# Patient Record
Sex: MALE | Race: WHITE
[De-identification: names, ages, dates, MRNs, and addresses within clinical notes are randomized per-mention and may not be internally consistent; named-entity substitution may affect disease eponyms.]

---

## 2021-03-26 ENCOUNTER — HOSPITAL ENCOUNTER (EMERGENCY)
Dept: HOSPITAL 94 - ER | Age: 51
Discharge: HOME | End: 2021-03-26
Payer: COMMERCIAL

## 2021-03-26 VITALS — BODY MASS INDEX: 20.04 KG/M2 | HEIGHT: 69 IN | WEIGHT: 135.28 LBS

## 2021-03-26 VITALS — SYSTOLIC BLOOD PRESSURE: 154 MMHG | DIASTOLIC BLOOD PRESSURE: 100 MMHG

## 2021-03-26 DIAGNOSIS — Z72.89: ICD-10-CM

## 2021-03-26 DIAGNOSIS — M79.605: ICD-10-CM

## 2021-03-26 DIAGNOSIS — G89.18: Primary | ICD-10-CM

## 2021-03-26 DIAGNOSIS — Z79.899: ICD-10-CM

## 2021-03-26 DIAGNOSIS — Z98.890: ICD-10-CM

## 2021-03-26 DIAGNOSIS — F17.200: ICD-10-CM

## 2021-03-26 PROCEDURE — 99284 EMERGENCY DEPT VISIT MOD MDM: CPT

## 2021-03-26 PROCEDURE — 64450 NJX AA&/STRD OTHER PN/BRANCH: CPT

## 2021-03-26 PROCEDURE — 64447 NJX AA&/STRD FEMORAL NRV IMG: CPT

## 2021-03-28 ENCOUNTER — HOSPITAL ENCOUNTER (EMERGENCY)
Dept: HOSPITAL 94 - ER | Age: 51
Discharge: LEFT BEFORE DISCHARGE | End: 2021-03-28
Payer: COMMERCIAL

## 2021-03-28 VITALS — SYSTOLIC BLOOD PRESSURE: 120 MMHG | DIASTOLIC BLOOD PRESSURE: 80 MMHG

## 2021-03-28 VITALS — WEIGHT: 154.32 LBS | BODY MASS INDEX: 22.86 KG/M2 | HEIGHT: 69 IN

## 2021-03-28 DIAGNOSIS — Z53.21: ICD-10-CM

## 2021-03-28 DIAGNOSIS — T83.091A: Primary | ICD-10-CM

## 2021-03-29 ENCOUNTER — HOSPITAL ENCOUNTER (EMERGENCY)
Dept: HOSPITAL 94 - ER | Age: 51
Discharge: HOME | End: 2021-03-29
Payer: COMMERCIAL

## 2021-03-29 VITALS — SYSTOLIC BLOOD PRESSURE: 139 MMHG | DIASTOLIC BLOOD PRESSURE: 89 MMHG

## 2021-03-29 VITALS — WEIGHT: 154.32 LBS | HEIGHT: 69 IN | BODY MASS INDEX: 22.86 KG/M2

## 2021-03-29 DIAGNOSIS — Z98.890: ICD-10-CM
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
## 2021-03-29 DIAGNOSIS — Z79.899: ICD-10-CM

## 2021-03-29 DIAGNOSIS — M96.840: Primary | ICD-10-CM

## 2021-03-29 DIAGNOSIS — Z72.89: ICD-10-CM
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
## 2021-03-29 DIAGNOSIS — Z87.81: ICD-10-CM

## 2021-03-29 PROCEDURE — 99283 EMERGENCY DEPT VISIT LOW MDM: CPT

## 2021-05-12 ENCOUNTER — HOSPITAL ENCOUNTER (INPATIENT)
Dept: HOSPITAL 94 - ER | Age: 51
LOS: 7 days | Discharge: HOME | DRG: 908 | End: 2021-05-19
Attending: FAMILY MEDICINE | Admitting: SPECIALIST
Payer: COMMERCIAL

## 2021-05-12 VITALS — HEIGHT: 70 IN | BODY MASS INDEX: 20.09 KG/M2 | WEIGHT: 140.3 LBS

## 2021-05-12 DIAGNOSIS — Z79.899: ICD-10-CM

## 2021-05-12 DIAGNOSIS — Z20.822: ICD-10-CM

## 2021-05-12 DIAGNOSIS — L03.116: ICD-10-CM

## 2021-05-12 DIAGNOSIS — F12.90: ICD-10-CM

## 2021-05-12 DIAGNOSIS — M86.8X7: ICD-10-CM
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
## 2021-05-12 DIAGNOSIS — T85.79XA: Primary | ICD-10-CM

## 2021-05-12 DIAGNOSIS — B95.61: ICD-10-CM

## 2021-05-12 DIAGNOSIS — Z79.82: ICD-10-CM

## 2021-05-12 DIAGNOSIS — F17.210: ICD-10-CM

## 2021-05-12 LAB
ALBUMIN SERPL BCP-MCNC: 3.7 G/DL (ref 3.4–5)
ALBUMIN/GLOB SERPL: 0.9 {RATIO} (ref 1.1–1.5)
ALP SERPL-CCNC: 87 IU/L (ref 46–116)
ALT SERPL W P-5'-P-CCNC: 23 U/L (ref 12–78)
ANION GAP SERPL CALCULATED.3IONS-SCNC: 8 MMOL/L (ref 8–16)
AST SERPL W P-5'-P-CCNC: 21 U/L (ref 10–37)
BASOPHILS # BLD AUTO: 0.1 X10'3 (ref 0–0.2)
BASOPHILS NFR BLD AUTO: 1.1 % (ref 0–1)
BILIRUB SERPL-MCNC: 0.3 MG/DL (ref 0.1–1)
BUN SERPL-MCNC: 12 MG/DL (ref 7–18)
BUN/CREAT SERPL: 12.9 (ref 5.4–32)
CALCIUM SERPL-MCNC: 8.7 MG/DL (ref 8.5–10.1)
CHLORIDE SERPL-SCNC: 100 MMOL/L (ref 99–107)
CLARITY UR: CLEAR
CO2 SERPL-SCNC: 25.1 MMOL/L (ref 24–32)
COLOR UR: (no result)
CREAT SERPL-MCNC: 0.93 MG/DL (ref 0.6–1.1)
EOSINOPHIL # BLD AUTO: 0.2 X10'3 (ref 0–0.9)
EOSINOPHIL NFR BLD AUTO: 2.6 % (ref 0–6)
ERYTHROCYTE [DISTWIDTH] IN BLOOD BY AUTOMATED COUNT: 13.3 % (ref 11.5–14.5)
GFR SERPL CREATININE-BSD FRML MDRD: 86 ML/MIN
GLUCOSE SERPL-MCNC: 115 MG/DL (ref 70–104)
GLUCOSE UR STRIP-MCNC: NEGATIVE MG/DL
HCT VFR BLD AUTO: 37.6 % (ref 42–52)
HGB BLD-MCNC: 13 G/DL (ref 14–17.9)
HGB UR QL STRIP: NEGATIVE
KETONES UR STRIP-MCNC: NEGATIVE MG/DL
LEUKOCYTE ESTERASE UR QL STRIP: NEGATIVE
LYMPHOCYTES # BLD AUTO: 1.7 X10'3 (ref 1.1–4.8)
LYMPHOCYTES NFR BLD AUTO: 17.9 % (ref 21–51)
MCH RBC QN AUTO: 33.5 PG (ref 27–31)
MCHC RBC AUTO-ENTMCNC: 34.7 G/DL (ref 33–36.5)
MCV RBC AUTO: 96.6 FL (ref 78–98)
MONOCYTES # BLD AUTO: 0.9 X10'3 (ref 0–0.9)
MONOCYTES NFR BLD AUTO: 9.6 % (ref 2–12)
NEUTROPHILS # BLD AUTO: 6.4 X10'3 (ref 1.8–7.7)
NEUTROPHILS NFR BLD AUTO: 68.8 % (ref 42–75)
NITRITE UR QL STRIP: NEGATIVE
PH UR STRIP: 6 [PH] (ref 4.8–8)
PLATELET # BLD AUTO: 224 X10'3 (ref 140–440)
PMV BLD AUTO: 7.6 FL (ref 7.4–10.4)
POTASSIUM SERPL-SCNC: 3.9 MMOL/L (ref 3.5–5.1)
PROT SERPL-MCNC: 7.6 G/DL (ref 6.4–8.2)
PROT UR QL STRIP: NEGATIVE MG/DL
RBC # BLD AUTO: 3.89 X10'6 (ref 4.7–6.1)
SODIUM SERPL-SCNC: 133 MMOL/L (ref 135–145)
SP GR UR STRIP: <=1.005 (ref 1–1.03)
URN COLLECT METHOD CLASS: (no result)
UROBILINOGEN UR STRIP-MCNC: 0.2 E.U/DL (ref 0.2–1)
WBC # BLD AUTO: 9.3 X10'3 (ref 4.5–11)

## 2021-05-12 PROCEDURE — 86900 BLOOD TYPING SEROLOGIC ABO: CPT

## 2021-05-12 PROCEDURE — 36415 COLL VENOUS BLD VENIPUNCTURE: CPT

## 2021-05-12 PROCEDURE — 87070 CULTURE OTHR SPECIMN AEROBIC: CPT

## 2021-05-12 PROCEDURE — 84132 ASSAY OF SERUM POTASSIUM: CPT

## 2021-05-12 PROCEDURE — 86901 BLOOD TYPING SEROLOGIC RH(D): CPT

## 2021-05-12 PROCEDURE — 80202 ASSAY OF VANCOMYCIN: CPT

## 2021-05-12 PROCEDURE — 87081 CULTURE SCREEN ONLY: CPT

## 2021-05-12 PROCEDURE — 80053 COMPREHEN METABOLIC PANEL: CPT

## 2021-05-12 PROCEDURE — 83605 ASSAY OF LACTIC ACID: CPT

## 2021-05-12 PROCEDURE — 96365 THER/PROPH/DIAG IV INF INIT: CPT

## 2021-05-12 PROCEDURE — 99285 EMERGENCY DEPT VISIT HI MDM: CPT

## 2021-05-12 PROCEDURE — BQ2S1ZZ COMPUTERIZED TOMOGRAPHY (CT SCAN) OF LEFT LOWER EXTREMITY USING LOW OSMOLAR CONTRAST: ICD-10-PCS

## 2021-05-12 PROCEDURE — 83735 ASSAY OF MAGNESIUM: CPT

## 2021-05-12 PROCEDURE — 85651 RBC SED RATE NONAUTOMATED: CPT

## 2021-05-12 PROCEDURE — 82948 REAGENT STRIP/BLOOD GLUCOSE: CPT

## 2021-05-12 PROCEDURE — 99152 MOD SED SAME PHYS/QHP 5/>YRS: CPT

## 2021-05-12 PROCEDURE — 87040 BLOOD CULTURE FOR BACTERIA: CPT

## 2021-05-12 PROCEDURE — 85025 COMPLETE CBC W/AUTO DIFF WBC: CPT

## 2021-05-12 PROCEDURE — 73701 CT LOWER EXTREMITY W/DYE: CPT

## 2021-05-12 PROCEDURE — 36558 INSERT TUNNELED CV CATH: CPT

## 2021-05-12 PROCEDURE — 99153 MOD SED SAME PHYS/QHP EA: CPT

## 2021-05-12 PROCEDURE — 87186 SC STD MICRODIL/AGAR DIL: CPT

## 2021-05-12 PROCEDURE — 76937 US GUIDE VASCULAR ACCESS: CPT

## 2021-05-12 PROCEDURE — 36573 INSJ PICC RS&I 5 YR+: CPT

## 2021-05-12 PROCEDURE — 87635 SARS-COV-2 COVID-19 AMP PRB: CPT

## 2021-05-12 PROCEDURE — 93005 ELECTROCARDIOGRAM TRACING: CPT

## 2021-05-12 PROCEDURE — 86885 COOMBS TEST INDIRECT QUAL: CPT

## 2021-05-12 PROCEDURE — 81003 URINALYSIS AUTO W/O SCOPE: CPT

## 2021-05-12 PROCEDURE — 84145 PROCALCITONIN (PCT): CPT

## 2021-05-12 PROCEDURE — 87077 CULTURE AEROBIC IDENTIFY: CPT

## 2021-05-13 VITALS — DIASTOLIC BLOOD PRESSURE: 88 MMHG | SYSTOLIC BLOOD PRESSURE: 127 MMHG

## 2021-05-13 VITALS — SYSTOLIC BLOOD PRESSURE: 123 MMHG | DIASTOLIC BLOOD PRESSURE: 80 MMHG

## 2021-05-13 VITALS — DIASTOLIC BLOOD PRESSURE: 74 MMHG | SYSTOLIC BLOOD PRESSURE: 115 MMHG

## 2021-05-13 VITALS — DIASTOLIC BLOOD PRESSURE: 78 MMHG | SYSTOLIC BLOOD PRESSURE: 128 MMHG

## 2021-05-13 RX ADMIN — CEFEPIME HYDROCHLORIDE SCH MLS/HR: 2 INJECTION, SOLUTION INTRAVENOUS at 16:40

## 2021-05-13 RX ADMIN — NICOTINE SCH PATCH: 7 PATCH TRANSDERMAL at 01:58

## 2021-05-13 RX ADMIN — VANCOMYCIN HYDROCHLORIDE SCH MLS/HR: 750 INJECTION, POWDER, LYOPHILIZED, FOR SOLUTION INTRAVENOUS at 12:00

## 2021-05-13 RX ADMIN — Medication SCH MMU: at 20:34

## 2021-05-13 RX ADMIN — VANCOMYCIN HYDROCHLORIDE SCH MLS/HR: 750 INJECTION, POWDER, LYOPHILIZED, FOR SOLUTION INTRAVENOUS at 22:06

## 2021-05-13 NOTE — NUR
Problems reprioritized. Patient report given, questions answered & plan of care reviewed 
with edis RN in surgical .

## 2021-05-13 NOTE — NUR
Problems reprioritized. Patient report given, questions answered & plan of care reviewed 
with Umesh Thomas traveler nurse.

## 2021-05-13 NOTE — NUR
Student documentation:

I have reviewed and agree with all interventions, assessments performed and documented by 
Viktoriya PORTILLO.

## 2021-05-13 NOTE — NUR
Patient in room ED 6. I have received report from Tracy Thomas from ED  and had the opportunity 
to ask questions and assume patient care.

## 2021-05-13 NOTE — NUR
Patient in room ORTHO 4020. I have received report from NORMAN Bourgeois   and had the opportunity 
to ask questions and assume patient care.

## 2021-05-13 NOTE — NUR
PAGER ID:  [phone] 

MESSAGE:  Eddie Chapman#[phone] - Pt is very itchy can he please have some Benadryl. Thank you, 
[phone]

## 2021-05-14 VITALS — DIASTOLIC BLOOD PRESSURE: 75 MMHG | SYSTOLIC BLOOD PRESSURE: 130 MMHG

## 2021-05-14 VITALS — DIASTOLIC BLOOD PRESSURE: 78 MMHG | SYSTOLIC BLOOD PRESSURE: 121 MMHG

## 2021-05-14 VITALS — SYSTOLIC BLOOD PRESSURE: 120 MMHG | DIASTOLIC BLOOD PRESSURE: 73 MMHG

## 2021-05-14 VITALS — DIASTOLIC BLOOD PRESSURE: 79 MMHG | SYSTOLIC BLOOD PRESSURE: 123 MMHG

## 2021-05-14 VITALS — SYSTOLIC BLOOD PRESSURE: 126 MMHG | DIASTOLIC BLOOD PRESSURE: 76 MMHG

## 2021-05-14 VITALS — DIASTOLIC BLOOD PRESSURE: 81 MMHG | SYSTOLIC BLOOD PRESSURE: 124 MMHG

## 2021-05-14 VITALS — DIASTOLIC BLOOD PRESSURE: 82 MMHG | SYSTOLIC BLOOD PRESSURE: 121 MMHG

## 2021-05-14 VITALS — DIASTOLIC BLOOD PRESSURE: 66 MMHG | SYSTOLIC BLOOD PRESSURE: 117 MMHG

## 2021-05-14 VITALS — SYSTOLIC BLOOD PRESSURE: 119 MMHG | DIASTOLIC BLOOD PRESSURE: 81 MMHG

## 2021-05-14 VITALS — DIASTOLIC BLOOD PRESSURE: 93 MMHG | SYSTOLIC BLOOD PRESSURE: 126 MMHG

## 2021-05-14 VITALS — SYSTOLIC BLOOD PRESSURE: 120 MMHG | DIASTOLIC BLOOD PRESSURE: 70 MMHG

## 2021-05-14 VITALS — SYSTOLIC BLOOD PRESSURE: 131 MMHG | DIASTOLIC BLOOD PRESSURE: 84 MMHG

## 2021-05-14 VITALS — DIASTOLIC BLOOD PRESSURE: 76 MMHG | SYSTOLIC BLOOD PRESSURE: 125 MMHG

## 2021-05-14 VITALS — DIASTOLIC BLOOD PRESSURE: 75 MMHG | SYSTOLIC BLOOD PRESSURE: 122 MMHG

## 2021-05-14 VITALS — SYSTOLIC BLOOD PRESSURE: 122 MMHG | DIASTOLIC BLOOD PRESSURE: 65 MMHG

## 2021-05-14 VITALS — DIASTOLIC BLOOD PRESSURE: 68 MMHG | SYSTOLIC BLOOD PRESSURE: 141 MMHG

## 2021-05-14 VITALS — SYSTOLIC BLOOD PRESSURE: 121 MMHG | DIASTOLIC BLOOD PRESSURE: 82 MMHG

## 2021-05-14 VITALS — SYSTOLIC BLOOD PRESSURE: 127 MMHG | DIASTOLIC BLOOD PRESSURE: 74 MMHG

## 2021-05-14 VITALS — DIASTOLIC BLOOD PRESSURE: 68 MMHG | SYSTOLIC BLOOD PRESSURE: 120 MMHG

## 2021-05-14 VITALS — DIASTOLIC BLOOD PRESSURE: 76 MMHG | SYSTOLIC BLOOD PRESSURE: 118 MMHG

## 2021-05-14 VITALS — DIASTOLIC BLOOD PRESSURE: 86 MMHG | SYSTOLIC BLOOD PRESSURE: 132 MMHG

## 2021-05-14 VITALS — SYSTOLIC BLOOD PRESSURE: 116 MMHG | DIASTOLIC BLOOD PRESSURE: 76 MMHG

## 2021-05-14 VITALS — DIASTOLIC BLOOD PRESSURE: 78 MMHG | SYSTOLIC BLOOD PRESSURE: 129 MMHG

## 2021-05-14 LAB
ALBUMIN SERPL BCP-MCNC: 3.1 G/DL (ref 3.4–5)
ALBUMIN/GLOB SERPL: 0.8 {RATIO} (ref 1.1–1.5)
ALP SERPL-CCNC: 75 IU/L (ref 46–116)
ALT SERPL W P-5'-P-CCNC: 23 U/L (ref 12–78)
ANION GAP SERPL CALCULATED.3IONS-SCNC: 7 MMOL/L (ref 8–16)
AST SERPL W P-5'-P-CCNC: 16 U/L (ref 10–37)
BASOPHILS # BLD AUTO: 0 X10'3 (ref 0–0.2)
BASOPHILS NFR BLD AUTO: 0.5 % (ref 0–1)
BILIRUB SERPL-MCNC: 0.6 MG/DL (ref 0.1–1)
BUN SERPL-MCNC: 7 MG/DL (ref 7–18)
BUN/CREAT SERPL: 7.7 (ref 5.4–32)
CALCIUM SERPL-MCNC: 9.2 MG/DL (ref 8.5–10.1)
CHLORIDE SERPL-SCNC: 101 MMOL/L (ref 99–107)
CO2 SERPL-SCNC: 30 MMOL/L (ref 24–32)
CREAT SERPL-MCNC: 0.91 MG/DL (ref 0.6–1.1)
EOSINOPHIL # BLD AUTO: 0.2 X10'3 (ref 0–0.9)
EOSINOPHIL NFR BLD AUTO: 2 % (ref 0–6)
ERYTHROCYTE [DISTWIDTH] IN BLOOD BY AUTOMATED COUNT: 13.1 % (ref 11.5–14.5)
GFR SERPL CREATININE-BSD FRML MDRD: 88 ML/MIN
GLUCOSE SERPL-MCNC: 118 MG/DL (ref 70–104)
HCT VFR BLD AUTO: 37.5 % (ref 42–52)
HGB BLD-MCNC: 13.1 G/DL (ref 14–17.9)
LYMPHOCYTES # BLD AUTO: 1 X10'3 (ref 1.1–4.8)
LYMPHOCYTES NFR BLD AUTO: 10.5 % (ref 21–51)
MCH RBC QN AUTO: 34 PG (ref 27–31)
MCHC RBC AUTO-ENTMCNC: 35 G/DL (ref 33–36.5)
MCV RBC AUTO: 96.9 FL (ref 78–98)
MONOCYTES # BLD AUTO: 1.2 X10'3 (ref 0–0.9)
MONOCYTES NFR BLD AUTO: 12.3 % (ref 2–12)
NEUTROPHILS # BLD AUTO: 7.4 X10'3 (ref 1.8–7.7)
NEUTROPHILS NFR BLD AUTO: 74.7 % (ref 42–75)
PLATELET # BLD AUTO: 230 X10'3 (ref 140–440)
PMV BLD AUTO: 7.8 FL (ref 7.4–10.4)
POTASSIUM SERPL-SCNC: 4 MMOL/L (ref 3.5–5.1)
PROT SERPL-MCNC: 7 G/DL (ref 6.4–8.2)
RBC # BLD AUTO: 3.87 X10'6 (ref 4.7–6.1)
SODIUM SERPL-SCNC: 138 MMOL/L (ref 135–145)
WBC # BLD AUTO: 9.9 X10'3 (ref 4.5–11)

## 2021-05-14 PROCEDURE — 0QPH35Z REMOVAL OF EXTERNAL FIXATION DEVICE FROM LEFT TIBIA, PERCUTANEOUS APPROACH: ICD-10-PCS | Performed by: ORTHOPAEDIC SURGERY

## 2021-05-14 PROCEDURE — 0HBLXZZ EXCISION OF LEFT LOWER LEG SKIN, EXTERNAL APPROACH: ICD-10-PCS | Performed by: ORTHOPAEDIC SURGERY

## 2021-05-14 RX ADMIN — CEFEPIME HYDROCHLORIDE SCH MLS/HR: 2 INJECTION, SOLUTION INTRAVENOUS at 00:27

## 2021-05-14 RX ADMIN — VANCOMYCIN HYDROCHLORIDE SCH MLS/HR: 750 INJECTION, POWDER, LYOPHILIZED, FOR SOLUTION INTRAVENOUS at 11:26

## 2021-05-14 RX ADMIN — CEFEPIME HYDROCHLORIDE SCH MLS/HR: 2 INJECTION, SOLUTION INTRAVENOUS at 07:17

## 2021-05-14 RX ADMIN — NICOTINE SCH PATCH: 7 PATCH TRANSDERMAL at 07:11

## 2021-05-14 RX ADMIN — CEFEPIME HYDROCHLORIDE SCH MLS/HR: 2 INJECTION, SOLUTION INTRAVENOUS at 16:44

## 2021-05-14 RX ADMIN — VANCOMYCIN HYDROCHLORIDE SCH MLS/HR: 750 INJECTION, POWDER, LYOPHILIZED, FOR SOLUTION INTRAVENOUS at 23:31

## 2021-05-14 RX ADMIN — Medication SCH MMU: at 07:10

## 2021-05-14 RX ADMIN — Medication SCH MMU: at 23:31

## 2021-05-14 RX ADMIN — NICOTINE SCH PATCH: 7 PATCH TRANSDERMAL at 01:50

## 2021-05-14 NOTE — NUR
Report called to receiving nurse. Transferred via BED, NO Belongings. PT STATES IS 
COMFORTABLE, LLE REMAINS ELEVATED. RECEIVING RN AT BEDSIDE TO RECEIVE PT, BLL, CALL LIGHT 
GIVEN, SIDE RAILS UP X 2. 

Special Issues communicated to receiving nurse. YES.

-------------------------------------------------------------------------------

Addendum: 05/14/21 at 1412 by Elayne Gonzales RN

-------------------------------------------------------------------------------

Amended: Links added.

## 2021-05-14 NOTE — NUR
Problems reprioritized. Patient report given, questions answered & plan of care reviewed 
with Juanita Thomas.

## 2021-05-14 NOTE — NUR
Received from OR via BED, accompanied by Anesthesiologist DR MCFADDEN and report given by 
Anesthesiologist. PT AWAKE, DROWSY, DENIES PAIN, LEFT LEG/FOOT FROM TOES TO BELOW KNEE IN 
SPLINT W/ACE WRAP COVERING CDI, TOES PWD, CRT 1-2 SECONDS. LLE ELEVATED ON LEG ELEVATOR.

-------------------------------------------------------------------------------

Addendum: 05/14/21 at 1338 by Elayne Gonzales RN

-------------------------------------------------------------------------------

Amended: Links added.

## 2021-05-14 NOTE — NUR
Problems reprioritized. Patient report given, questions answered & plan of care reviewed 
with NORMAN Batista.

## 2021-05-14 NOTE — NUR
Pt. awake  A & O at this time. Postoperative  left ankle in the with short cast in place. 
Pt. able to winkle toes freely and sensation present on assessment. Reminded pt. about non 
weight bearing status; pt. verbalized understanding.  Call light within reach.

-------------------------------------------------------------------------------

Addendum: 05/15/21 at 0326 by Juanita Gilmore RN

-------------------------------------------------------------------------------

Amended: Links added.

## 2021-05-14 NOTE — NUR
Patient in room ORTHO 4020. I have received report from Lynne AUSTIN  and had the opportunity to 
ask questions and assume patient care.

-------------------------------------------------------------------------------

Addendum: 05/14/21 at 1856 by Juanita Gilmore RN

-------------------------------------------------------------------------------

Amended: Links added.

## 2021-05-15 VITALS — DIASTOLIC BLOOD PRESSURE: 67 MMHG | SYSTOLIC BLOOD PRESSURE: 107 MMHG

## 2021-05-15 VITALS — SYSTOLIC BLOOD PRESSURE: 107 MMHG | DIASTOLIC BLOOD PRESSURE: 67 MMHG

## 2021-05-15 VITALS — DIASTOLIC BLOOD PRESSURE: 74 MMHG | SYSTOLIC BLOOD PRESSURE: 117 MMHG

## 2021-05-15 VITALS — DIASTOLIC BLOOD PRESSURE: 64 MMHG | SYSTOLIC BLOOD PRESSURE: 108 MMHG

## 2021-05-15 VITALS — DIASTOLIC BLOOD PRESSURE: 74 MMHG | SYSTOLIC BLOOD PRESSURE: 126 MMHG

## 2021-05-15 LAB
ALBUMIN SERPL BCP-MCNC: 2.9 G/DL (ref 3.4–5)
ALBUMIN/GLOB SERPL: 0.7 {RATIO} (ref 1.1–1.5)
ALP SERPL-CCNC: 69 IU/L (ref 46–116)
ALT SERPL W P-5'-P-CCNC: 15 U/L (ref 12–78)
ANION GAP SERPL CALCULATED.3IONS-SCNC: 8 MMOL/L (ref 8–16)
AST SERPL W P-5'-P-CCNC: 14 U/L (ref 10–37)
BASOPHILS # BLD AUTO: 0 X10'3 (ref 0–0.2)
BASOPHILS NFR BLD AUTO: 0.1 % (ref 0–1)
BILIRUB SERPL-MCNC: 0.4 MG/DL (ref 0.1–1)
BUN SERPL-MCNC: 8 MG/DL (ref 7–18)
BUN/CREAT SERPL: 10.7 (ref 5.4–32)
CALCIUM SERPL-MCNC: 9.3 MG/DL (ref 8.5–10.1)
CHLORIDE SERPL-SCNC: 103 MMOL/L (ref 99–107)
CO2 SERPL-SCNC: 28 MMOL/L (ref 24–32)
CREAT SERPL-MCNC: 0.75 MG/DL (ref 0.6–1.1)
EOSINOPHIL # BLD AUTO: 0 X10'3 (ref 0–0.9)
EOSINOPHIL NFR BLD AUTO: 0 % (ref 0–6)
ERYTHROCYTE [DISTWIDTH] IN BLOOD BY AUTOMATED COUNT: 13.2 % (ref 11.5–14.5)
GFR SERPL CREATININE-BSD FRML MDRD: > 90 ML/MIN
GLUCOSE SERPL-MCNC: 139 MG/DL (ref 70–104)
HCT VFR BLD AUTO: 34.4 % (ref 42–52)
HGB BLD-MCNC: 12 G/DL (ref 14–17.9)
LYMPHOCYTES # BLD AUTO: 0.5 X10'3 (ref 1.1–4.8)
LYMPHOCYTES NFR BLD AUTO: 4.8 % (ref 21–51)
MCH RBC QN AUTO: 33.8 PG (ref 27–31)
MCHC RBC AUTO-ENTMCNC: 35 G/DL (ref 33–36.5)
MCV RBC AUTO: 96.5 FL (ref 78–98)
MONOCYTES # BLD AUTO: 0.9 X10'3 (ref 0–0.9)
MONOCYTES NFR BLD AUTO: 8.2 % (ref 2–12)
NEUTROPHILS # BLD AUTO: 9.7 X10'3 (ref 1.8–7.7)
NEUTROPHILS NFR BLD AUTO: 86.9 % (ref 42–75)
PLATELET # BLD AUTO: 254 X10'3 (ref 140–440)
PMV BLD AUTO: 7.7 FL (ref 7.4–10.4)
POTASSIUM SERPL-SCNC: 4.3 MMOL/L (ref 3.5–5.1)
PROT SERPL-MCNC: 6.9 G/DL (ref 6.4–8.2)
RBC # BLD AUTO: 3.57 X10'6 (ref 4.7–6.1)
SODIUM SERPL-SCNC: 139 MMOL/L (ref 135–145)
WBC # BLD AUTO: 11.2 X10'3 (ref 4.5–11)

## 2021-05-15 RX ADMIN — Medication SCH MMU: at 20:07

## 2021-05-15 RX ADMIN — Medication SCH MMU: at 08:22

## 2021-05-15 RX ADMIN — MICONAZOLE NITRATE SCH APPLIC: 20 CREAM TOPICAL at 13:26

## 2021-05-15 RX ADMIN — NICOTINE SCH PATCH: 7 PATCH TRANSDERMAL at 08:23

## 2021-05-15 RX ADMIN — CEFEPIME HYDROCHLORIDE SCH MLS/HR: 2 INJECTION, SOLUTION INTRAVENOUS at 01:36

## 2021-05-15 RX ADMIN — VANCOMYCIN HYDROCHLORIDE SCH MLS/HR: 750 INJECTION, POWDER, LYOPHILIZED, FOR SOLUTION INTRAVENOUS at 10:57

## 2021-05-15 NOTE — NUR
Problems reprioritized. Patient report given, questions answered & plan of care reviewed 
with Lynne AUSTIN. 

-------------------------------------------------------------------------------

Addendum: 05/15/21 at 0747 by Juanita Gilmore RN

-------------------------------------------------------------------------------

Amended: Links added.

## 2021-05-16 VITALS — SYSTOLIC BLOOD PRESSURE: 124 MMHG | DIASTOLIC BLOOD PRESSURE: 74 MMHG

## 2021-05-16 VITALS — SYSTOLIC BLOOD PRESSURE: 117 MMHG | DIASTOLIC BLOOD PRESSURE: 75 MMHG

## 2021-05-16 VITALS — SYSTOLIC BLOOD PRESSURE: 121 MMHG | DIASTOLIC BLOOD PRESSURE: 79 MMHG

## 2021-05-16 VITALS — SYSTOLIC BLOOD PRESSURE: 131 MMHG | DIASTOLIC BLOOD PRESSURE: 82 MMHG

## 2021-05-16 LAB
ALBUMIN SERPL BCP-MCNC: 2.9 G/DL (ref 3.4–5)
ALBUMIN/GLOB SERPL: 0.7 {RATIO} (ref 1.1–1.5)
ALP SERPL-CCNC: 61 IU/L (ref 46–116)
ALT SERPL W P-5'-P-CCNC: 21 U/L (ref 12–78)
ANION GAP SERPL CALCULATED.3IONS-SCNC: 5 MMOL/L (ref 8–16)
AST SERPL W P-5'-P-CCNC: 14 U/L (ref 10–37)
BASOPHILS # BLD AUTO: 0.1 X10'3 (ref 0–0.2)
BASOPHILS NFR BLD AUTO: 1 % (ref 0–1)
BILIRUB SERPL-MCNC: 0.2 MG/DL (ref 0.1–1)
BUN SERPL-MCNC: 10 MG/DL (ref 7–18)
BUN/CREAT SERPL: 11.9 (ref 5.4–32)
CALCIUM SERPL-MCNC: 9.1 MG/DL (ref 8.5–10.1)
CHLORIDE SERPL-SCNC: 104 MMOL/L (ref 99–107)
CO2 SERPL-SCNC: 29.6 MMOL/L (ref 24–32)
CREAT SERPL-MCNC: 0.84 MG/DL (ref 0.6–1.1)
EOSINOPHIL # BLD AUTO: 0.1 X10'3 (ref 0–0.9)
EOSINOPHIL NFR BLD AUTO: 1.3 % (ref 0–6)
ERYTHROCYTE [DISTWIDTH] IN BLOOD BY AUTOMATED COUNT: 13.3 % (ref 11.5–14.5)
GFR SERPL CREATININE-BSD FRML MDRD: > 90 ML/MIN
GLUCOSE SERPL-MCNC: 99 MG/DL (ref 70–104)
HCT VFR BLD AUTO: 33.4 % (ref 42–52)
HGB BLD-MCNC: 11.7 G/DL (ref 14–17.9)
LYMPHOCYTES # BLD AUTO: 1.9 X10'3 (ref 1.1–4.8)
LYMPHOCYTES NFR BLD AUTO: 29 % (ref 21–51)
MCH RBC QN AUTO: 34.1 PG (ref 27–31)
MCHC RBC AUTO-ENTMCNC: 35.1 G/DL (ref 33–36.5)
MCV RBC AUTO: 97.2 FL (ref 78–98)
MONOCYTES # BLD AUTO: 0.6 X10'3 (ref 0–0.9)
MONOCYTES NFR BLD AUTO: 9.2 % (ref 2–12)
NEUTROPHILS # BLD AUTO: 3.8 X10'3 (ref 1.8–7.7)
NEUTROPHILS NFR BLD AUTO: 59.5 % (ref 42–75)
PLATELET # BLD AUTO: 263 X10'3 (ref 140–440)
PMV BLD AUTO: 7.5 FL (ref 7.4–10.4)
POTASSIUM SERPL-SCNC: 3.4 MMOL/L (ref 3.5–5.1)
PROT SERPL-MCNC: 6.8 G/DL (ref 6.4–8.2)
RBC # BLD AUTO: 3.44 X10'6 (ref 4.7–6.1)
SODIUM SERPL-SCNC: 139 MMOL/L (ref 135–145)
WBC # BLD AUTO: 6.5 X10'3 (ref 4.5–11)

## 2021-05-16 RX ADMIN — MICONAZOLE NITRATE SCH APPLIC: 20 CREAM TOPICAL at 08:24

## 2021-05-16 RX ADMIN — CEFAZOLIN SODIUM SCH MLS/HR: 2 INJECTION, SOLUTION INTRAVENOUS at 23:55

## 2021-05-16 RX ADMIN — Medication SCH MMU: at 08:19

## 2021-05-16 RX ADMIN — CEFAZOLIN SODIUM SCH MLS/HR: 2 INJECTION, SOLUTION INTRAVENOUS at 10:22

## 2021-05-16 RX ADMIN — Medication SCH MMU: at 19:30

## 2021-05-16 RX ADMIN — CEFAZOLIN SODIUM SCH MLS/HR: 2 INJECTION, SOLUTION INTRAVENOUS at 16:51

## 2021-05-16 RX ADMIN — NICOTINE SCH PATCH: 7 PATCH TRANSDERMAL at 08:20

## 2021-05-16 NOTE — NUR
Problems reprioritized. Patient report given, questions answered & plan of care reviewed 
with Kathy AUSTIN.

## 2021-05-16 NOTE — NUR
Patient in room ORTHO 4020. I have received report from Kathy AUSTIN   and had the opportunity 
to ask questions and assume patient care.

## 2021-05-17 VITALS — SYSTOLIC BLOOD PRESSURE: 123 MMHG | DIASTOLIC BLOOD PRESSURE: 82 MMHG

## 2021-05-17 VITALS — DIASTOLIC BLOOD PRESSURE: 79 MMHG | SYSTOLIC BLOOD PRESSURE: 121 MMHG

## 2021-05-17 VITALS — SYSTOLIC BLOOD PRESSURE: 112 MMHG | DIASTOLIC BLOOD PRESSURE: 65 MMHG

## 2021-05-17 VITALS — SYSTOLIC BLOOD PRESSURE: 122 MMHG | DIASTOLIC BLOOD PRESSURE: 65 MMHG

## 2021-05-17 VITALS — SYSTOLIC BLOOD PRESSURE: 128 MMHG | DIASTOLIC BLOOD PRESSURE: 82 MMHG

## 2021-05-17 LAB
ALBUMIN SERPL BCP-MCNC: 3.1 G/DL (ref 3.4–5)
ALBUMIN/GLOB SERPL: 0.8 {RATIO} (ref 1.1–1.5)
ALP SERPL-CCNC: 62 IU/L (ref 46–116)
ALT SERPL W P-5'-P-CCNC: 20 U/L (ref 12–78)
ANION GAP SERPL CALCULATED.3IONS-SCNC: 9 MMOL/L (ref 8–16)
AST SERPL W P-5'-P-CCNC: 18 U/L (ref 10–37)
BASOPHILS # BLD AUTO: 0.1 X10'3 (ref 0–0.2)
BASOPHILS NFR BLD AUTO: 1.1 % (ref 0–1)
BILIRUB SERPL-MCNC: 0.1 MG/DL (ref 0.1–1)
BUN SERPL-MCNC: 8 MG/DL (ref 7–18)
BUN/CREAT SERPL: 10.3 (ref 5.4–32)
CALCIUM SERPL-MCNC: 9.1 MG/DL (ref 8.5–10.1)
CHLORIDE SERPL-SCNC: 103 MMOL/L (ref 99–107)
CO2 SERPL-SCNC: 26.2 MMOL/L (ref 24–32)
CREAT SERPL-MCNC: 0.78 MG/DL (ref 0.6–1.1)
EOSINOPHIL # BLD AUTO: 0.2 X10'3 (ref 0–0.9)
EOSINOPHIL NFR BLD AUTO: 4.1 % (ref 0–6)
ERYTHROCYTE [DISTWIDTH] IN BLOOD BY AUTOMATED COUNT: 13.2 % (ref 11.5–14.5)
GFR SERPL CREATININE-BSD FRML MDRD: > 90 ML/MIN
GLUCOSE SERPL-MCNC: 90 MG/DL (ref 70–104)
HCT VFR BLD AUTO: 37.3 % (ref 42–52)
HGB BLD-MCNC: 12.9 G/DL (ref 14–17.9)
LYMPHOCYTES # BLD AUTO: 1.7 X10'3 (ref 1.1–4.8)
LYMPHOCYTES NFR BLD AUTO: 29 % (ref 21–51)
MAGNESIUM SERPL-MCNC: 1.9 MG/DL (ref 1.5–2.4)
MCH RBC QN AUTO: 33.2 PG (ref 27–31)
MCHC RBC AUTO-ENTMCNC: 34.7 G/DL (ref 33–36.5)
MCV RBC AUTO: 95.7 FL (ref 78–98)
MONOCYTES # BLD AUTO: 0.7 X10'3 (ref 0–0.9)
MONOCYTES NFR BLD AUTO: 11.4 % (ref 2–12)
NEUTROPHILS # BLD AUTO: 3.3 X10'3 (ref 1.8–7.7)
NEUTROPHILS NFR BLD AUTO: 54.4 % (ref 42–75)
PLATELET # BLD AUTO: 332 X10'3 (ref 140–440)
PMV BLD AUTO: 7.3 FL (ref 7.4–10.4)
POTASSIUM SERPL-SCNC: 3.9 MMOL/L (ref 3.5–5.1)
PROT SERPL-MCNC: 7 G/DL (ref 6.4–8.2)
RBC # BLD AUTO: 3.89 X10'6 (ref 4.7–6.1)
SODIUM SERPL-SCNC: 138 MMOL/L (ref 135–145)
WBC # BLD AUTO: 6 X10'3 (ref 4.5–11)

## 2021-05-17 RX ADMIN — Medication SCH MMU: at 20:36

## 2021-05-17 RX ADMIN — CEFAZOLIN SODIUM SCH MLS/HR: 2 INJECTION, SOLUTION INTRAVENOUS at 16:05

## 2021-05-17 RX ADMIN — CEFAZOLIN SODIUM SCH MLS/HR: 2 INJECTION, SOLUTION INTRAVENOUS at 07:23

## 2021-05-17 RX ADMIN — ENOXAPARIN SODIUM SCH MG: 100 INJECTION SUBCUTANEOUS at 20:37

## 2021-05-17 RX ADMIN — Medication SCH MMU: at 07:15

## 2021-05-17 RX ADMIN — MICONAZOLE NITRATE SCH APPLIC: 20 CREAM TOPICAL at 12:18

## 2021-05-17 RX ADMIN — NICOTINE SCH PATCH: 7 PATCH TRANSDERMAL at 07:19

## 2021-05-17 NOTE — NUR
Attempted to place PICC line x4 but unable to thread catheter past approx 15 to 20cm with 
after positioning maneuvers.  Access difficult as well. Patient RN made aware and calling MD 
for alternative.

-------------------------------------------------------------------------------

Addendum: 05/18/21 at 1035 by Alicja Callahan RN

-------------------------------------------------------------------------------

Amended: Links added.

## 2021-05-18 VITALS — DIASTOLIC BLOOD PRESSURE: 82 MMHG | SYSTOLIC BLOOD PRESSURE: 115 MMHG

## 2021-05-18 VITALS — DIASTOLIC BLOOD PRESSURE: 53 MMHG | SYSTOLIC BLOOD PRESSURE: 102 MMHG

## 2021-05-18 VITALS — DIASTOLIC BLOOD PRESSURE: 74 MMHG | SYSTOLIC BLOOD PRESSURE: 125 MMHG

## 2021-05-18 LAB
ALBUMIN SERPL BCP-MCNC: 3.4 G/DL (ref 3.4–5)
ALBUMIN/GLOB SERPL: 0.8 {RATIO} (ref 1.1–1.5)
ALP SERPL-CCNC: 80 IU/L (ref 46–116)
ALT SERPL W P-5'-P-CCNC: 27 U/L (ref 12–78)
ANION GAP SERPL CALCULATED.3IONS-SCNC: 6 MMOL/L (ref 8–16)
AST SERPL W P-5'-P-CCNC: 20 U/L (ref 10–37)
BASOPHILS # BLD AUTO: 0.1 X10'3 (ref 0–0.2)
BASOPHILS NFR BLD AUTO: 0.9 % (ref 0–1)
BILIRUB SERPL-MCNC: 0.2 MG/DL (ref 0.1–1)
BUN SERPL-MCNC: 11 MG/DL (ref 7–18)
BUN/CREAT SERPL: 11.6 (ref 5.4–32)
CALCIUM SERPL-MCNC: 9.4 MG/DL (ref 8.5–10.1)
CHLORIDE SERPL-SCNC: 101 MMOL/L (ref 99–107)
CO2 SERPL-SCNC: 30.7 MMOL/L (ref 24–32)
CREAT SERPL-MCNC: 0.95 MG/DL (ref 0.6–1.1)
EOSINOPHIL # BLD AUTO: 0.4 X10'3 (ref 0–0.9)
EOSINOPHIL NFR BLD AUTO: 5.8 % (ref 0–6)
ERYTHROCYTE [DISTWIDTH] IN BLOOD BY AUTOMATED COUNT: 13.3 % (ref 11.5–14.5)
GFR SERPL CREATININE-BSD FRML MDRD: 84 ML/MIN
GLUCOSE SERPL-MCNC: 90 MG/DL (ref 70–104)
HCT VFR BLD AUTO: 39.8 % (ref 42–52)
HGB BLD-MCNC: 14 G/DL (ref 14–17.9)
LYMPHOCYTES # BLD AUTO: 1.8 X10'3 (ref 1.1–4.8)
LYMPHOCYTES NFR BLD AUTO: 27.7 % (ref 21–51)
MAGNESIUM SERPL-MCNC: 2.1 MG/DL (ref 1.5–2.4)
MCH RBC QN AUTO: 33.5 PG (ref 27–31)
MCHC RBC AUTO-ENTMCNC: 35.1 G/DL (ref 33–36.5)
MCV RBC AUTO: 95.5 FL (ref 78–98)
MONOCYTES # BLD AUTO: 0.8 X10'3 (ref 0–0.9)
MONOCYTES NFR BLD AUTO: 13 % (ref 2–12)
NEUTROPHILS # BLD AUTO: 3.4 X10'3 (ref 1.8–7.7)
NEUTROPHILS NFR BLD AUTO: 52.6 % (ref 42–75)
PLATELET # BLD AUTO: 396 X10'3 (ref 140–440)
PMV BLD AUTO: 7.1 FL (ref 7.4–10.4)
POTASSIUM SERPL-SCNC: 4.1 MMOL/L (ref 3.5–5.1)
PROT SERPL-MCNC: 7.6 G/DL (ref 6.4–8.2)
RBC # BLD AUTO: 4.17 X10'6 (ref 4.7–6.1)
SODIUM SERPL-SCNC: 138 MMOL/L (ref 135–145)
WBC # BLD AUTO: 6.4 X10'3 (ref 4.5–11)

## 2021-05-18 PROCEDURE — 05JY3ZZ INSPECTION OF UPPER VEIN, PERCUTANEOUS APPROACH: ICD-10-PCS | Performed by: HOSPITALIST

## 2021-05-18 RX ADMIN — CEFAZOLIN SODIUM SCH MLS/HR: 2 INJECTION, SOLUTION INTRAVENOUS at 17:16

## 2021-05-18 RX ADMIN — ENOXAPARIN SODIUM SCH MG: 100 INJECTION SUBCUTANEOUS at 21:55

## 2021-05-18 RX ADMIN — CEFAZOLIN SODIUM SCH MLS/HR: 2 INJECTION, SOLUTION INTRAVENOUS at 08:03

## 2021-05-18 RX ADMIN — CEFAZOLIN SODIUM SCH MLS/HR: 2 INJECTION, SOLUTION INTRAVENOUS at 00:31

## 2021-05-18 RX ADMIN — Medication SCH MMU: at 08:03

## 2021-05-18 RX ADMIN — NICOTINE SCH PATCH: 7 PATCH TRANSDERMAL at 08:03

## 2021-05-18 RX ADMIN — MICONAZOLE NITRATE SCH APPLIC: 20 CREAM TOPICAL at 08:04

## 2021-05-18 RX ADMIN — Medication SCH MMU: at 21:55

## 2021-05-18 NOTE — NUR
Patient in room ORTHO 4020. I have received report from Liliana AUSTIN and had the opportunity to 
ask questions and assume patient care.

## 2021-05-18 NOTE — NUR
Patient asking when and if the PICC line is going to be placed today. Educted patient that 
he has an order for MS if needed and an order for Ativan if needed.

## 2021-05-18 NOTE — NUR
Initial: Pt admit w/ LE cellulitis s/p recent L ankle surgery s/p hardware 

removal for infection this admit per EMR. PO % avg regular diet 

meeting needs. [facility] 5/17. Will continue to monitor.

Rec:

1. continue regular diet

2. routine bowel care

3. scaled wt this admit

-------------------------------------------------------------------------------

Addendum: 05/18/21 at 1357 by Asa Jung RD

-------------------------------------------------------------------------------

Amended: Links added.

## 2021-05-18 NOTE — NUR
PICC line NORMAN Helm called to inform that she will be here to do the PICC when she is done 
going to lunch.

## 2021-05-19 VITALS — SYSTOLIC BLOOD PRESSURE: 107 MMHG | DIASTOLIC BLOOD PRESSURE: 77 MMHG

## 2021-05-19 VITALS — SYSTOLIC BLOOD PRESSURE: 106 MMHG | DIASTOLIC BLOOD PRESSURE: 74 MMHG

## 2021-05-19 LAB
MAGNESIUM SERPL-MCNC: 2 MG/DL (ref 1.5–2.4)
POTASSIUM SERPL-SCNC: 4.1 MMOL/L (ref 3.5–5.1)

## 2021-05-19 PROCEDURE — 02H633Z INSERTION OF INFUSION DEVICE INTO RIGHT ATRIUM, PERCUTANEOUS APPROACH: ICD-10-PCS | Performed by: RADIOLOGY

## 2021-05-19 PROCEDURE — 0JH63XZ INSERTION OF TUNNELED VASCULAR ACCESS DEVICE INTO CHEST SUBCUTANEOUS TISSUE AND FASCIA, PERCUTANEOUS APPROACH: ICD-10-PCS | Performed by: RADIOLOGY

## 2021-05-19 PROCEDURE — B548ZZA ULTRASONOGRAPHY OF SUPERIOR VENA CAVA, GUIDANCE: ICD-10-PCS | Performed by: RADIOLOGY

## 2021-05-19 RX ADMIN — CEFAZOLIN SODIUM SCH MLS/HR: 2 INJECTION, SOLUTION INTRAVENOUS at 08:59

## 2021-05-19 RX ADMIN — NICOTINE SCH PATCH: 7 PATCH TRANSDERMAL at 09:04

## 2021-05-19 RX ADMIN — CEFAZOLIN SODIUM SCH MLS/HR: 2 INJECTION, SOLUTION INTRAVENOUS at 00:30

## 2021-05-19 RX ADMIN — MICONAZOLE NITRATE SCH APPLIC: 20 CREAM TOPICAL at 08:00

## 2021-05-19 RX ADMIN — Medication SCH MMU: at 08:00

## 2021-05-19 NOTE — NUR
patient very upset and agitated this am waiting for placement of groshong . DR Blackburn into see 
patient.

## 2021-05-19 NOTE — NUR
Groshong placed patient stable on post op vitals. No drainage around dresing noticed. 
patient is for discharge. Awaiting approval of eliquis med with insurance per Erika CAse 
manager.

## 2021-05-19 NOTE — NUR
Eliquis approved, patient stable on post ops. VS. Dr Blackburn informed of patients discharge. 
Patient will  ABX at [facility]. stable for discharge. All Dc instructions given 
to patient. patient DC home via private car  with friend

## 2021-05-19 NOTE — NUR
Patient in room ORTHO 4020. I have received report from April RN   and had the opportunity 
to ask questions and assume patient care.

## 2021-05-21 NOTE — NUR
CASE MANAGEMENT DISCHARGE FOLLOW UP: Spoke with pt via telephone. Reports that he is doing 
okay, does still have pain in his ankle, c/o pain at catheter site (states from sleeping on 
it); denies CP, SOB, fever/chills, s/sx infection. Verbalizes understanding of s/sx 
requiring further evaluation/emergent assistance. Verbalizes understanding of new and 
current medications, states has everything he needs for abx administration at home, will go 
in Wednesday to the infusion center. Verbalizes compliance with MD discharge instructions. 
Verbalizes understanding of the importance in making/keeping follow-up appointments, has f/u 
with PMD next Thursday (5/27/21). Pt does express some frustration regarding not being able 
to go home with a PICC, states there were 2 attempts made and felt that the nurse was 
distracted which is why catheter couldn't be placed. Wishes they could have have placed PICC 
as tunneled central cath is very uncomfortable. States no further questions/concerns at this 
time. 



1612 T/c to pharmacy, medication ready for .



1614 T/c to pt, notified that medication ready for .

## 2022-01-14 ENCOUNTER — HOSPITAL ENCOUNTER (EMERGENCY)
Dept: HOSPITAL 94 - ER | Age: 52
LOS: 1 days | Discharge: LEFT BEFORE DISCHARGE | End: 2022-01-15
Payer: SELF-PAY

## 2022-01-14 VITALS — WEIGHT: 135.28 LBS | BODY MASS INDEX: 20.04 KG/M2 | HEIGHT: 69 IN

## 2022-01-14 VITALS — SYSTOLIC BLOOD PRESSURE: 135 MMHG | DIASTOLIC BLOOD PRESSURE: 91 MMHG
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
## 2022-01-14 DIAGNOSIS — Z53.21: ICD-10-CM
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
## 2022-01-14 DIAGNOSIS — S61.211A: Primary | ICD-10-CM
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
## 2022-01-14 PROCEDURE — 73140 X-RAY EXAM OF FINGER(S): CPT
